# Patient Record
Sex: MALE | Race: WHITE | HISPANIC OR LATINO | Employment: UNEMPLOYED | URBAN - METROPOLITAN AREA
[De-identification: names, ages, dates, MRNs, and addresses within clinical notes are randomized per-mention and may not be internally consistent; named-entity substitution may affect disease eponyms.]

---

## 2022-12-09 ENCOUNTER — HOSPITAL ENCOUNTER (EMERGENCY)
Facility: HOSPITAL | Age: 32
Discharge: HOME | End: 2022-12-09
Attending: EMERGENCY MEDICINE | Admitting: EMERGENCY MEDICINE

## 2022-12-09 ENCOUNTER — APPOINTMENT (OUTPATIENT)
Dept: GENERAL RADIOLOGY | Facility: HOSPITAL | Age: 32
End: 2022-12-09

## 2022-12-09 VITALS
DIASTOLIC BLOOD PRESSURE: 60 MMHG | OXYGEN SATURATION: 95 % | HEIGHT: 69 IN | RESPIRATION RATE: 20 BRPM | WEIGHT: 180 LBS | SYSTOLIC BLOOD PRESSURE: 119 MMHG | TEMPERATURE: 99.9 F | HEART RATE: 82 BPM | BODY MASS INDEX: 26.66 KG/M2

## 2022-12-09 DIAGNOSIS — J10.1 INFLUENZA A: Primary | ICD-10-CM

## 2022-12-09 LAB
FLUAV SUBTYP SPEC NAA+PROBE: DETECTED
FLUBV RNA ISLT QL NAA+PROBE: NOT DETECTED
SARS-COV-2 RNA PNL SPEC NAA+PROBE: NOT DETECTED

## 2022-12-09 PROCEDURE — C9803 HOPD COVID-19 SPEC COLLECT: HCPCS

## 2022-12-09 PROCEDURE — 99283 EMERGENCY DEPT VISIT LOW MDM: CPT

## 2022-12-09 PROCEDURE — 71045 X-RAY EXAM CHEST 1 VIEW: CPT

## 2022-12-09 PROCEDURE — 87636 SARSCOV2 & INF A&B AMP PRB: CPT

## 2022-12-09 NOTE — DISCHARGE INSTRUCTIONS
Over-the-counter Tylenol or ibuprofen as needed for fever and body aches  Rest, increase fluids  Follow-up with PMD or clinic of choice in 5 to 7 days if symptoms not improving  Return to the ER for fever, chills, chest pain, shortness of breath, vomiting, diarrhea, any new or worsening symptoms
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
## 2022-12-09 NOTE — ED TRIAGE NOTES
Pt presents to ED with complaints of intermittent cough, fever, diarrhea, sore throat and body aches x2 weeks. tMax 103.5 at home. Pt took tylenol 1 hour PTA.

## 2022-12-09 NOTE — ED PROVIDER NOTES
MD ATTESTATION NOTE    The KOSTA and I have discussed this patient's history, physical exam, and treatment plan.  I have reviewed the documentation and personally had a face to face interaction with the patient. I affirm the documentation and agree with the treatment and plan.  The attached note describes my personal findings.    I provided a substantive portion of the care of this patient. I personally performed the physical exam, in its entirety.    Lionel Barlow is a 32 y.o. male who presents to the ED c/o having a fever intermittently for the last 2 weeks.  He has had body aches, low back aching, chills, nausea, cough.  He denies any sick contacts.  He has not take any medicine for his symptoms.  Nothing makes it worse or better.  Family reports he has had decreased oral intake over the last 24 hours.      On exam:  GENERAL: Awake, alert, no acute distress  SKIN: Warm, dry  HENT: Normocephalic, atraumatic  EYES: no scleral icterus  CV: regular rhythm, regular rate  RESPIRATORY: normal effort, lungs clear  ABDOMEN: soft, nontender, nondistended  MUSCULOSKELETAL: no deformity, no meningismus  NEURO: alert, moves all extremities, follows commands    Labs  Recent Results (from the past 24 hour(s))   COVID-19 and FLU A/B PCR - Swab, Nasopharynx    Collection Time: 12/09/22  4:35 PM    Specimen: Nasopharynx; Swab   Result Value Ref Range    COVID19 Not Detected Not Detected - Ref. Range    Influenza A PCR Detected (A) Not Detected    Influenza B PCR Not Detected Not Detected       Radiology  XR Chest 1 View    Result Date: 12/9/2022  XR CHEST 1 VW-clinical: Cough, fever  FINDINGS: Cardiac size within normal limits. No effusion, edema or acute airspace disease. Mediastinum and hubert have a satisfactory appearance. No grossly suspicious pulmonary parenchymal lesion seen.  CONCLUSION: No active disease of the chest  This report was finalized on 12/9/2022 5:21 PM by Dr. Jesús Romano M.D.        Medical Decision  Making:  ED Course as of 12/09/22 2056   Fri Dec 09, 2022   1723 Patient is a well-appearing 32-year-old who presents today with flulike symptoms including fever, nonproductive cough, body aches for the past 2 weeks.  COVID-19 and flu swab have been ordered along with a chest x-ray to rule out pneumonia. [MS]   1723 Reviewed pt's history and workup with Dr. Mora.  After a bedside evaluation, he agrees with the plan of care.     [MS]   1723 Patient updated on positive influenza a seen on swab.  Discussed symptomatic treatment of his symptoms, increase fluids, Tylenol or ibuprofen, strict return to ER precautions provided.  Patient's heart rate 82, he is not hypoxic is stable for discharge at this time. [MS]   1810 Influenza A PCR(!): Detected [MS]   1834 I viewed the patient's chest xray imaging in PACS.  My interpretation is no infiltrate or bony abnormality.  See dictation for official radiology interpretation.     [MS]      ED Course User Index  [MS] Idalia Harris, APRN       Since COVID and flu are endemic in the area we will swab for COVID and flu.  We will obtain chest x-ray to rule out pneumonia.  He has no hypoxia.    Procedures:  Procedures      PPE: The patient wore a mask and I wore an N95 mask throughout the entire patient encounter.      The patient qualifies to receive the vaccine, but they have not yet received it.    Diagnosis  Final diagnoses:   Influenza A       Note Disclaimer: At [facility], we believe that sharing information builds trust and better relationships. You are receiving this note because you recently visited [facility]. It is possible you will see health information before a provider has talked with you about it. This kind of information can be easy to misunderstand. To help you fully understand what it means for your health, we urge you to discuss this note with your provider.     Keon Mora MD  12/09/22 2056

## 2023-01-06 ENCOUNTER — APPOINTMENT (OUTPATIENT)
Dept: CT IMAGING | Facility: HOSPITAL | Age: 33
End: 2023-01-06
Payer: COMMERCIAL

## 2023-01-06 ENCOUNTER — HOSPITAL ENCOUNTER (EMERGENCY)
Facility: HOSPITAL | Age: 33
Discharge: HOME | End: 2023-01-06
Attending: EMERGENCY MEDICINE | Admitting: EMERGENCY MEDICINE
Payer: COMMERCIAL

## 2023-01-06 ENCOUNTER — APPOINTMENT (OUTPATIENT)
Dept: GENERAL RADIOLOGY | Facility: HOSPITAL | Age: 33
End: 2023-01-06
Payer: COMMERCIAL

## 2023-01-06 VITALS
HEIGHT: 71 IN | TEMPERATURE: 96.7 F | DIASTOLIC BLOOD PRESSURE: 87 MMHG | OXYGEN SATURATION: 98 % | BODY MASS INDEX: 29.94 KG/M2 | HEART RATE: 75 BPM | RESPIRATION RATE: 16 BRPM | SYSTOLIC BLOOD PRESSURE: 132 MMHG | WEIGHT: 213.85 LBS

## 2023-01-06 DIAGNOSIS — M62.838 CERVICAL PARASPINAL MUSCLE SPASM: ICD-10-CM
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
## 2023-01-06 DIAGNOSIS — S09.90XA CLOSED HEAD INJURY WITHOUT LOSS OF CONSCIOUSNESS, INITIAL ENCOUNTER: ICD-10-CM

## 2023-01-06 PROCEDURE — 70450 CT HEAD/BRAIN W/O DYE: CPT

## 2023-01-06 PROCEDURE — 72125 CT NECK SPINE W/O DYE: CPT

## 2023-01-06 PROCEDURE — 73070 X-RAY EXAM OF ELBOW: CPT

## 2023-01-06 PROCEDURE — 99283 EMERGENCY DEPT VISIT LOW MDM: CPT

## 2023-01-06 RX ORDER — METHOCARBAMOL 750 MG/1
750 TABLET, FILM COATED ORAL 3 TIMES DAILY PRN
Qty: 30 TABLET | Refills: 0 | Status: SHIPPED | OUTPATIENT
Start: 2023-01-06

## 2023-01-06 RX ORDER — ACETAMINOPHEN 500 MG
1000 TABLET ORAL ONCE
Status: COMPLETED | OUTPATIENT
Start: 2023-01-06 | End: 2023-01-06

## 2023-01-06 RX ADMIN — ACETAMINOPHEN 1000 MG: 500 TABLET ORAL at 17:25
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
## 2023-09-08 ENCOUNTER — TELEPHONE (OUTPATIENT)
Dept: FAMILY MEDICINE CLINIC | Facility: CLINIC | Age: 33
End: 2023-09-08
Payer: COMMERCIAL

## 2023-09-08 NOTE — TELEPHONE ENCOUNTER
Lionel's wife Harleen is wanting to know if Dr. Monteiro would take him on as a new patient.     Dr. Monteiro sees her mother Wallace.       [phone]

## 2023-10-12 ENCOUNTER — HOSPITAL ENCOUNTER (EMERGENCY)
Facility: HOSPITAL | Age: 33
Discharge: HOME | End: 2023-10-12
Attending: EMERGENCY MEDICINE
Payer: COMMERCIAL

## 2023-10-12 VITALS
BODY MASS INDEX: 27.44 KG/M2 | TEMPERATURE: 97.4 F | RESPIRATION RATE: 16 BRPM | OXYGEN SATURATION: 99 % | DIASTOLIC BLOOD PRESSURE: 98 MMHG | SYSTOLIC BLOOD PRESSURE: 136 MMHG | WEIGHT: 196 LBS | HEIGHT: 71 IN | HEART RATE: 64 BPM

## 2023-10-12 DIAGNOSIS — J34.0 ABSCESS OF NOSE: Primary | ICD-10-CM

## 2023-10-12 PROCEDURE — 99282 EMERGENCY DEPT VISIT SF MDM: CPT

## 2023-10-12 RX ORDER — SULFAMETHOXAZOLE AND TRIMETHOPRIM 800; 160 MG/1; MG/1
2 TABLET ORAL 2 TIMES DAILY
Qty: 40 TABLET | Refills: 0 | Status: SHIPPED | OUTPATIENT
Start: 2023-10-12

## 2023-10-12 NOTE — ED PROVIDER NOTES
EMERGENCY DEPARTMENT ENCOUNTER    Room Number:  05/05  Date of encounter:  10/12/2023  PCP: Provider, No Known  Historian: Patient, spouse  Chronic or social conditions impacting care (social determinants of health): Nothing  Spouse providing [place]ian interpretation    HPI:  Chief Complaint: Sore to left nare  A complete HPI/ROS/PMH/PSH/SH/FH are unobtainable due to:  nothing    Context: Lionel Barlow is a 33 y.o. male who presents to the ED c/o several day history of wound to the left nare.  Patient presents with a small swollen area to the septum of his left nare.  He reports the area is swollen, red with clear mervin drainage.  He denies any fevers, chills.  Denies any previous history of MRSA or immunocompromise.    Review of prior external notes (non-ED):   Nothing    Review of prior external test results outside of this encounter:  I reviewed a negative head CT from 1/6/2023.    PAST MEDICAL HISTORY  Active Ambulatory Problems     Diagnosis Date Noted    No Active Ambulatory Problems     Resolved Ambulatory Problems     Diagnosis Date Noted    No Resolved Ambulatory Problems     No Additional Past Medical History         PAST SURGICAL HISTORY  History reviewed. No pertinent surgical history.      FAMILY HISTORY  History reviewed. No pertinent family history.      SOCIAL HISTORY  Social History     Socioeconomic History    Marital status: Single   Tobacco Use    Smoking status: Never    Smokeless tobacco: Never   Vaping Use    Vaping Use: Never used   Substance and Sexual Activity    Drug use: Never         ALLERGIES  Patient has no known allergies.        REVIEW OF SYSTEMS  All systems reviewed and negative except for those discussed in HPI.       PHYSICAL EXAM    I have reviewed the triage vital signs and nursing notes.    ED Triage Vitals   Temp Heart Rate Resp BP SpO2   10/12/23 0911 10/12/23 0911 10/12/23 0911 10/12/23 0916 10/12/23 0911   97.4 øF (36.3 øC) 77 16 148/99 99 %      Temp src Heart Rate  Source Patient Position BP Location FiO2 (%)   10/12/23 0911 10/12/23 0911 -- -- --   Tympanic Monitor          Physical Exam  GENERAL: Alert, oriented, well-appearing, not distressed  HENT: Small papular lesion to the septum of the left nare.  Mildly tender.  Mild crusty discharge.  No significant surrounding erythema.  No significant facial swelling or redness.  Mild left-sided cervical lymphadenopathy.  EYES: no scleral icterus, EOMI  CV: regular rhythm, regular rate, no murmur  RESPIRATORY: normal effort, CTA  ABDOMEN: soft, nontender  MUSCULOSKELETAL: no deformity, FROM, no calf swelling or tenderness  NEURO: alert, moves all extremities, follows commands  SKIN: warm, dry    MEDICATIONS GIVEN IN ER    Medications - No data to display      ADDITIONAL ORDERS CONSIDERED BUT NOT ORDERED:  Considered admission however patient is afebrile and healthy.  I believe a trial of outpatient antibiotics would be preferable.      PROGRESS, DATA ANALYSIS, CONSULTS, AND MEDICAL DECISION MAKING    All labs have been independently interpreted by myself.  All radiology studies have been independently interpreted by myself and discussed with radiologist dictating the report.   EKG's independently interpreted by myself.  Discussion below represents my analysis of pertinent findings related to patient's condition, differential diagnosis, treatment plan and final disposition.    I have discussed case with Dr. Crump, emergency room physician.  He has performed his own bedside examination and agrees with treatment plan.    ED Course as of 10/12/23 1529   Thu Oct 12, 2023   0953 Patient presents with several day history of wound to the left nare.  No fevers, chills.  On exam patient appears to have a small abscess with mild drainage.  No significant soft tissue swelling surrounding the nose.  He is not immunocompromised.  No previous history of MRSA.  Plan to treat with mupirocin and Bactrim. [EE]      ED Course User Index  [EE] Shiv  NICOLE Palmer       AS OF 15:29 EDT VITALS:    BP - 136/98  HR - 64  TEMP - 97.4 øF (36.3 øC) (Tympanic)  O2 SATS - 99%        DIAGNOSIS  Final diagnoses:   Abscess of nose         DISPOSITION  Admitted      Dictated utilizing Dragon dictation     Allan Chaney PA  10/12/23 6531